# Patient Record
Sex: FEMALE | Race: WHITE | NOT HISPANIC OR LATINO | Employment: FULL TIME | URBAN - METROPOLITAN AREA
[De-identification: names, ages, dates, MRNs, and addresses within clinical notes are randomized per-mention and may not be internally consistent; named-entity substitution may affect disease eponyms.]

---

## 2017-01-19 ENCOUNTER — OFFICE VISIT (OUTPATIENT)
Dept: PSYCHIATRY | Facility: HOSPITAL | Age: 52
End: 2017-01-19

## 2017-01-19 DIAGNOSIS — F43.23 ADJUSTMENT DISORDER WITH MIXED ANXIETY AND DEPRESSED MOOD: Primary | ICD-10-CM

## 2017-01-19 PROCEDURE — 90834 PSYTX W PT 45 MINUTES: CPT | Performed by: SOCIAL WORKER

## 2017-01-19 NOTE — PROGRESS NOTES
"5:05pm-5:55pm  Client angry, frustrated by her treatment at work.  The new boss does not seem to value her knowledge of the company or the contribution that she has made over the years.  She is actively looking for another job.  Son is still out of the house but still a source of stress.   is working small jobs on occasion but she wants him to get a \"real job\" and contribute.  "

## 2017-01-23 ENCOUNTER — TELEPHONE (OUTPATIENT)
Dept: RETAIL CLINIC | Facility: CLINIC | Age: 52
End: 2017-01-23

## 2017-02-02 ENCOUNTER — APPOINTMENT (OUTPATIENT)
Dept: PSYCHIATRY | Facility: HOSPITAL | Age: 52
End: 2017-02-02

## 2017-02-02 ENCOUNTER — OFFICE VISIT (OUTPATIENT)
Dept: PSYCHIATRY | Facility: HOSPITAL | Age: 52
End: 2017-02-02

## 2017-02-02 DIAGNOSIS — F43.23 ADJUSTMENT DISORDER WITH MIXED ANXIETY AND DEPRESSED MOOD: Primary | ICD-10-CM

## 2017-02-02 PROCEDURE — 90834 PSYTX W PT 45 MINUTES: CPT | Performed by: SOCIAL WORKER

## 2017-02-02 NOTE — PROGRESS NOTES
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  Her new manager brought someone in from her previous job.  Client has a good severance package but is upset by the way it was handled.  She has had several interviews already.  Has told  he needs to get a real job.

## 2017-02-16 ENCOUNTER — OFFICE VISIT (OUTPATIENT)
Dept: PSYCHIATRY | Facility: HOSPITAL | Age: 52
End: 2017-02-16

## 2017-02-16 DIAGNOSIS — F43.23 ADJUSTMENT DISORDER WITH MIXED ANXIETY AND DEPRESSED MOOD: Primary | ICD-10-CM

## 2017-02-16 PROCEDURE — 90834 PSYTX W PT 45 MINUTES: CPT | Performed by: SOCIAL WORKER

## 2017-02-16 NOTE — PROGRESS NOTES
1:10pm-2:00pm  Client has had several promising interviews.   sent a counter offer and she is pleased with what they settled on.  Still upset by the way she was treated after having been with the company so long.   did get a job-part-time but a regular job.  Client is worried about her son.

## 2017-03-02 ENCOUNTER — TELEPHONE (OUTPATIENT)
Dept: RETAIL CLINIC | Facility: CLINIC | Age: 52
End: 2017-03-02

## 2017-03-03 ENCOUNTER — OFFICE VISIT (OUTPATIENT)
Dept: PSYCHIATRY | Facility: HOSPITAL | Age: 52
End: 2017-03-03

## 2017-03-03 DIAGNOSIS — F43.23 ADJUSTMENT DISORDER WITH MIXED ANXIETY AND DEPRESSED MOOD: Primary | ICD-10-CM

## 2017-03-03 PROCEDURE — 90834 PSYTX W PT 45 MINUTES: CPT | Performed by: SOCIAL WORKER

## 2017-03-03 RX ORDER — MONTELUKAST SODIUM 10 MG/1
10 TABLET ORAL NIGHTLY
Qty: 90 TABLET | Refills: 3
Start: 2017-03-03

## 2017-03-03 NOTE — PROGRESS NOTES
10:05am:10:55am  Client is encouraged that she has had so many interviews, people reaching out to her.  Salary is an issue because she was paid well at [employer] and had great benefits.  She is enjoying the time off as she has always worked and for years has been the primary breadwinner.  Still has some bad feelings about the way her job ended even though she was no longer happy there.  Reviewed treatment plan.

## 2017-03-08 NOTE — PLAN OF CARE
Problem:  Patient Care Overview (Adult)  Goal: Plan of Care Review    03/08/17 1312   Coping/Psychosocial Response Interventions   Plan Of Care Reviewed With patient   Coping/Psychosocial   Patient Agreement with Plan of Care agrees   Patient Care Overview   Progress improving   Outcome Evaluation   Outcome Summary/Follow up Plan Client rated her relationship issues as a 7 out of 10; vocational stress resolved as she is no longer working for the company associated with the stress.

## 2017-03-08 NOTE — TREATMENT PLAN
Multi-Disciplinary Problems (from Behavioral Health Treatment Plan)    Active Problems     Problem: Relationship Issues (Priority: --)  (Start Date: 03/08/17) (Resolve Date: --)    Problem Details:  The patient self-scales this problem as a 7 with 10 being the worst.         Goal Start Date End Date    Patient will initiate personal change to improve relationships. 03/08/17 --    Goal Details:  Progress toward goal:  The patient self-scales their progress related to this goal as a 7 with 10 being the worst.         Goal Intervention Frequency Start Date End Date    Assist patient in identifying behaviors that focus on relationship building and process the changes needed to improve relationships. Weekly 03/08/17 --    Intervention Details:  Duration of treatment until until discharged.               Problem: BH Patient Care Overview (Adult) (Priority: --)  (Start Date: 03/08/17) (Resolve Date: --)    Goal Start Date End Date    Plan of Care Review 03/08/17 --                       I have discussed and reviewed this treatment plan with the patient.  It has been printed for signatures.

## 2017-03-10 ENCOUNTER — TELEPHONE (OUTPATIENT)
Dept: RETAIL CLINIC | Facility: CLINIC | Age: 52
End: 2017-03-10

## 2017-03-10 NOTE — TELEPHONE ENCOUNTER
Patient called to request a refill on in house dispensed Singulair. Refill available and dispensed

## 2017-03-17 ENCOUNTER — OFFICE VISIT (OUTPATIENT)
Dept: PSYCHIATRY | Facility: HOSPITAL | Age: 52
End: 2017-03-17

## 2017-03-17 DIAGNOSIS — F43.23 ADJUSTMENT DISORDER WITH MIXED ANXIETY AND DEPRESSED MOOD: Primary | ICD-10-CM

## 2017-03-17 PROCEDURE — 90834 PSYTX W PT 45 MINUTES: CPT | Performed by: SOCIAL WORKER

## 2017-03-17 NOTE — PROGRESS NOTES
9:00am-9:50am  Client accepted a job to start 4/3.  She is anxious about it but the salary and benefits are good and they seem to be stable financially.  She is going to take a vacation before starting.  Annoyed that her  was not interested in vacation.  Worried about what their life would look like after their daughter leaves the house.

## 2017-03-31 ENCOUNTER — OFFICE VISIT (OUTPATIENT)
Dept: PSYCHIATRY | Facility: HOSPITAL | Age: 52
End: 2017-03-31

## 2017-03-31 DIAGNOSIS — F43.23 ADJUSTMENT DISORDER WITH MIXED ANXIETY AND DEPRESSED MOOD: Primary | ICD-10-CM

## 2017-03-31 PROCEDURE — 90834 PSYTX W PT 45 MINUTES: CPT | Performed by: SOCIAL WORKER

## 2017-03-31 NOTE — PROGRESS NOTES
12:05pm-12:55pm  Client will start her new job on Monday-mixed feelings.  She went to [place] with her daughter and a friend and then [place] trip.  Her  was not interested in going on the trips.  Client seems increasingly more frustrated that he never wants to do anything.  She worries about what will happen when her daughter leaves home.

## 2017-04-19 ENCOUNTER — OFFICE VISIT (OUTPATIENT)
Dept: RETAIL CLINIC | Facility: CLINIC | Age: 52
End: 2017-04-19

## 2017-04-19 VITALS
TEMPERATURE: 97.2 F | RESPIRATION RATE: 14 BRPM | HEIGHT: 67 IN | SYSTOLIC BLOOD PRESSURE: 147 MMHG | BODY MASS INDEX: 23.7 KG/M2 | OXYGEN SATURATION: 99 % | HEART RATE: 70 BPM | WEIGHT: 151 LBS | DIASTOLIC BLOOD PRESSURE: 89 MMHG

## 2017-04-19 DIAGNOSIS — R60.9 DEPENDENT EDEMA: ICD-10-CM

## 2017-04-19 DIAGNOSIS — I83.893 VARICOSE VEINS OF BILATERAL LOWER EXTREMITIES WITH OTHER COMPLICATIONS: ICD-10-CM

## 2017-04-19 DIAGNOSIS — R60.9 PERIPHERAL EDEMA: Primary | ICD-10-CM

## 2017-04-19 PROCEDURE — 99213 OFFICE O/P EST LOW 20 MIN: CPT | Performed by: FAMILY MEDICINE

## 2017-04-19 PROCEDURE — 36415 COLL VENOUS BLD VENIPUNCTURE: CPT | Performed by: FAMILY MEDICINE

## 2017-04-19 RX ORDER — ASPIRIN 81 MG/1
81 TABLET, CHEWABLE ORAL DAILY
COMMUNITY
End: 2022-08-24

## 2017-04-19 RX ORDER — FUROSEMIDE 20 MG/1
TABLET ORAL
Qty: 30 TABLET | Refills: 2 | Status: SHIPPED | OUTPATIENT
Start: 2017-04-19 | End: 2022-08-25 | Stop reason: HOSPADM

## 2017-04-19 NOTE — PROGRESS NOTES
"Subjective   Manuela Molina is a 52 y.o. female presents for   Chief Complaint   Patient presents with   • Foot Swelling     pt on Maxide for fluid retention not working       History of Present Illness    Manuela is a 52 year old that is usually healthy but has a history of pedal edema but for the past 2-3 weeks the swelling has getting worse.  Is swollen even in the morning.  She doesn't think that her diet has changed any, although she has been eating out a lot.  Drinks 4-5 glasses per day and also sweet tea for the caffeine.  Swelling as worst at the end of the day.  No redness or pain, but sometimes feet feel tight at the end of the day.  Otherwise is feeling fine.  Has been more stressed lately.  She has been at a new job for the last 3 weeks and will be traveling to [facility] on Saturday.  It will take around 24 hrs to get there and she's worried about swelling on the trip to get there (plane and car).      The following portions of the patient's history were reviewed and updated as appropriate: allergies, current medications, past family history, past medical history, past social history, past surgical history and problem list.    Review of Systems   Constitutional: Negative.    HENT: Negative.    Eyes: Negative.    Respiratory: Negative.    Cardiovascular: Positive for leg swelling (feet only). Negative for chest pain and palpitations.   Gastrointestinal: Negative.    Endocrine: Negative.    Genitourinary: Negative.    Musculoskeletal: Negative.    Skin: Negative.    Allergic/Immunologic: Negative.    Neurological: Negative.    Hematological: Negative.    Psychiatric/Behavioral: Negative.        Objective   Vitals:    04/19/17 1056   BP: 147/89   Pulse: 70   Resp: 14   Temp: 97.2 °F (36.2 °C)   TempSrc: Oral   SpO2: 99%   Weight: 151 lb (68.5 kg)   Height: 67\" (170.2 cm)     Body mass index is 23.65 kg/(m^2).    Physical Exam   Constitutional: She is oriented to person, place, and time. She appears " well-developed and well-nourished. No distress.   HENT:   Head: Normocephalic and atraumatic.   Mouth/Throat: Oropharynx is clear and moist. No oropharyngeal exudate.   Eyes: Pupils are equal, round, and reactive to light. No scleral icterus.   Neck: Neck supple. No thyromegaly present.   Cardiovascular: Normal rate, regular rhythm, S1 normal, S2 normal, normal heart sounds and intact distal pulses.   No extrasystoles are present. Exam reveals no gallop, no S3, no S4, no distant heart sounds and no friction rub.    No murmur heard.  Pulmonary/Chest: Effort normal and breath sounds normal. No respiratory distress. She has no wheezes. She has no rales.   Musculoskeletal: Normal range of motion. She exhibits edema (dependent edema or both feet (R > L)). She exhibits no tenderness.   Lymphadenopathy:     She has no cervical adenopathy.   Neurological: She is alert and oriented to person, place, and time.   Skin: Skin is warm and dry. No rash noted. No erythema.   Nursing note and vitals reviewed.      Assessment/Plan   Manuela was seen today for foot swelling.    Diagnoses and all orders for this visit:    Peripheral edema    Dependent edema  -     Basic Metabolic Panel  -     Compression Stockings    Varicose veins of bilateral lower extremities with other complications    Other orders  -     furosemide (LASIX) 20 MG tablet; 1/2 tab po QD-BID prn edema     Advised to take an aspirin 81mg daily while flying.  Wear compression stockings while on the plane or if will be walking or prn edema.  Increase water intake gradually to 100oz per day.  Check a BMP and if BUN and Creat are both normal then ok to fill Lasix and take only as a last resort prn.

## 2017-04-20 LAB
BUN SERPL-MCNC: 9 MG/DL (ref 6–24)
BUN/CREAT SERPL: 14 (ref 9–23)
CALCIUM SERPL-MCNC: 9.1 MG/DL (ref 8.7–10.2)
CHLORIDE SERPL-SCNC: 98 MMOL/L (ref 96–106)
CO2 SERPL-SCNC: 25 MMOL/L (ref 18–29)
CREAT SERPL-MCNC: 0.65 MG/DL (ref 0.57–1)
GLUCOSE SERPL-MCNC: 92 MG/DL (ref 65–99)
POTASSIUM SERPL-SCNC: 3.9 MMOL/L (ref 3.5–5.2)
SODIUM SERPL-SCNC: 141 MMOL/L (ref 134–144)

## 2017-04-24 ENCOUNTER — APPOINTMENT (OUTPATIENT)
Dept: PSYCHIATRY | Facility: HOSPITAL | Age: 52
End: 2017-04-24

## 2017-05-17 ENCOUNTER — APPOINTMENT (OUTPATIENT)
Dept: PSYCHIATRY | Facility: HOSPITAL | Age: 52
End: 2017-05-17

## 2017-06-05 ENCOUNTER — OFFICE VISIT (OUTPATIENT)
Dept: PSYCHIATRY | Facility: HOSPITAL | Age: 52
End: 2017-06-05

## 2017-06-05 DIAGNOSIS — F43.23 ADJUSTMENT DISORDER WITH MIXED ANXIETY AND DEPRESSED MOOD: Primary | ICD-10-CM

## 2017-06-05 PROCEDURE — 90834 PSYTX W PT 45 MINUTES: CPT | Performed by: SOCIAL WORKER

## 2017-06-05 NOTE — PROGRESS NOTES
5:10pm-6:00pm  Client does not like her new job.  The pay and benefits are good but the job is not challenging and she does not see how she can make any headway in her current situation.  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  She had another interview last week and is hoping they make her an offer.  The pay would be less and the benefits not as rich but it would be interesting.  She has only been there 2 months but is very unhappy.  Will call, as needed.

## 2019-04-10 ENCOUNTER — TRANSCRIBE ORDERS (OUTPATIENT)
Dept: OCCUPATIONAL THERAPY | Facility: CLINIC | Age: 54
End: 2019-04-10

## 2019-04-10 DIAGNOSIS — S46.911D RIGHT SHOULDER STRAIN, SUBSEQUENT ENCOUNTER: Primary | ICD-10-CM

## 2019-04-12 ENCOUNTER — TREATMENT (OUTPATIENT)
Dept: PHYSICAL THERAPY | Facility: CLINIC | Age: 54
End: 2019-04-12

## 2019-04-12 DIAGNOSIS — S46.911D RIGHT SHOULDER STRAIN, SUBSEQUENT ENCOUNTER: ICD-10-CM

## 2019-04-12 DIAGNOSIS — S46.911D STRAIN OF RIGHT SHOULDER, SUBSEQUENT ENCOUNTER: Primary | ICD-10-CM

## 2019-04-12 PROCEDURE — 97035 APP MDLTY 1+ULTRASOUND EA 15: CPT | Performed by: PHYSICAL THERAPIST

## 2019-04-12 PROCEDURE — 97140 MANUAL THERAPY 1/> REGIONS: CPT | Performed by: PHYSICAL THERAPIST

## 2019-04-12 PROCEDURE — 97110 THERAPEUTIC EXERCISES: CPT | Performed by: PHYSICAL THERAPIST

## 2019-04-12 PROCEDURE — 97162 PT EVAL MOD COMPLEX 30 MIN: CPT | Performed by: PHYSICAL THERAPIST

## 2019-04-12 PROCEDURE — 97530 THERAPEUTIC ACTIVITIES: CPT | Performed by: PHYSICAL THERAPIST

## 2019-04-12 NOTE — PROGRESS NOTES
Orthopedic / Sports / Industrial Physical Therapy  Physical Therapy Initial Evaluation and Plan of Care    Patient Name: Manuela Molina          :  1965  Referring Physician: Chidi Simms MD  Diagnosis: Strain of right shoulder, subsequent encounter [M63.042W]    Date of Evaluation: 2019  ______________________________________________________________________    Subjective Evaluation    History of Present Illness  Date of onset: 2018  Mechanism of injury: Fell down stairs - Missed last step carrying laptop case (L) hand and suit case in (R) hand  and hit (R) elbow on suit case -     Pain  Location: Lateral, ANT/Lat shoulder (R) down arm into ant/lat elbow / volar forearm (proximal) ; Denies NT-ing in (R) UE -   Quality: Stabbing.  Alleviating factors: Stop activity -   Exacerbated by: (R) side-lying; AROM / FE, Ext, IRB,     Hand dominance: right    Diagnostic Tests  X-ray: normal    Treatments  Current treatment: medication  Patient Goals  Patient/family treatment goals: Pain alleviation; normal mobility, strength, function, and return to normal job w/o restrictions -        ___________________________________________________  Objective       Postural Observations    Additional Postural Observation Details  Rounded shoulder posture; Ant HH; Fwd head;     Palpation     Additional Palpation Details  Palpable crepitus / grinding superior GH Jt w/ AROM/ mobilizatoin of shoulder;   Minimal palpable tenderness of RTC and LH biceps tendon and deltoid -    Active Range of Motion     Additional Active Range of Motion Details  (R) shoulder; Limited and painful IRB; FE near full w/ pain down (R) arm to elbow -     Strength/Myotome Testing     Additional Strength Details  Strong Empty Can and ER/IRS    Tests     Additional Tests Details  (R) [place]: (+) Posterior Impingement; (+) [place];s; (+) Speeds      TREATMENT:  % 2.0 posterior shoulder (R);                             MT: Posterior GH  Jt Mobs;  Manual stretching into ERS/ER/ERA w/ PA mobilization x 20 min.                            TE: Scap Retraction (B), (R) 20/10 sec ea  FUNCTIONAL ACTIVITIES: X 10 min  · TAPING / BRACING: NA  · Jt protection, ADL modification; Posture and     ___________________________________________________  Assessment & Plan     Assessment  Assessment details: (R) shoulder strain / pain; Posterior Impingement with probable labral / SLAP involvement -     PROBLEMS: Pain; Limited mobility, intolerance to ADL's and job-related duties and postural dysfunciton -   PROGNOSIS: Good -     GOALS:   SHORT TERM GOALS: 2 weeks:  1) HEP Initiated; 2) Pain decreased 50%:   3) AROM  increased:  4) Improved functional ability grossly;   LONG TERM GOALS: 4 weeks (or at time of DISCHARGE): 1) (I) HEP; 2) AROM WFL and pain free; 3) Strength / mobility to be able to perform all ADL's and job-related activities w/o restrictions;                 Plan  Planned therapy interventions: flexibility, home exercise program, joint mobilization, manual therapy, neuromuscular re-education, postural training, soft tissue mobilization, strengthening, stretching and therapeutic activities (Modalities prn; Taping prn; )  Frequency: 3x week  Duration in weeks: 4  Treatment plan discussed with: patient    ___________________________________________________  Manual Therapy:    20     mins  45878;   Therapeutic Exercise:    05     mins  22129;     Neuromuscular Brandon:        mins  43541;   Therapeutic Activity:     15     mins  73037;     Ultrasound:     10     mins  17094;    Electrical Stimulation:        mins  12742 ( );  Dry Needling          mins self-pay   Gait Training:          mins  99385;  EVAL TIME:   25 mins    Timed Treatment:   50   mins                Total Treatment:     85   mins    PT SIGNATURE:   Rodrigo Perales, PT  DATE TREATMENT INITIATED: 4/12/2019  ___________________________________________________  Initial  Certification  Certification Period: 7/11/2019  I certify that the therapy services are furnished while this patient is under my care.  The services outlined above are required by this patient, and will be reviewed every 90 days.     PHYSICIAN: ________________________________  DATE: ______  Chidi Simms MD        Please sign and return via fax to [phone].. Thank you, [facility].  ______________________________________________________________________  [address] [place] [address]  Phone: [phone] Fax: [phone]

## 2019-04-15 ENCOUNTER — TREATMENT (OUTPATIENT)
Dept: PHYSICAL THERAPY | Facility: CLINIC | Age: 54
End: 2019-04-15

## 2019-04-15 DIAGNOSIS — S46.911D STRAIN OF RIGHT SHOULDER, SUBSEQUENT ENCOUNTER: Primary | ICD-10-CM

## 2019-04-15 PROCEDURE — 97140 MANUAL THERAPY 1/> REGIONS: CPT | Performed by: PHYSICAL THERAPIST

## 2019-04-15 PROCEDURE — 97112 NEUROMUSCULAR REEDUCATION: CPT | Performed by: PHYSICAL THERAPIST

## 2019-04-15 PROCEDURE — 97110 THERAPEUTIC EXERCISES: CPT | Performed by: PHYSICAL THERAPIST

## 2019-04-15 NOTE — PROGRESS NOTES
Physical Therapy Daily Progress Note    Patient Name: Manuela Molina         :  1965  Referring Physician: Chidi Simms MD    Subjective   Manuela Molina reports: Feeling better with improved mobility - Not much pain today -     Objective   Full AROM w/o pain (much improved IRB)  Improved passive ERS w/o posterior impingement; ER/ERS near full with posterior impingement at end range -    See Exercise, Manual, and Modality Logs for complete treatment.     Functional / Therapeutic Activities:   min  · TAPING / BRACING: NA  · Jt protection, ADL modification; Posture and      Assessment/Plan  : (R) shoulder strain / pain; Posterior Impingement with probable labral / SLAP involvement -    Improving with decreased pain and improve mobility and function -    Progress strengthening /stabilization /functional activity       _________________________________________________  Manual Therapy:    15     mins  83039;  Therapeutic Exercise:    25     mins  75184;     Neuromuscular Brandon:    10    mins  20197;    Therapeutic Activity:          mins  06125;     Gait Training:           mins  71344;     Ultrasound:          mins  55986;    Electrical Stimulation:         mins  31713 ( );  Dry Needling          mins self-pay    Timed Treatment:   50   mins                  Total Treatment:     60   mins    Rodrigo Perales PT  Physical Therapist

## 2019-04-17 ENCOUNTER — TREATMENT (OUTPATIENT)
Dept: PHYSICAL THERAPY | Facility: CLINIC | Age: 54
End: 2019-04-17

## 2019-04-17 DIAGNOSIS — S46.911D STRAIN OF RIGHT SHOULDER, SUBSEQUENT ENCOUNTER: Primary | ICD-10-CM

## 2019-04-17 DIAGNOSIS — S46.911D RIGHT SHOULDER STRAIN, SUBSEQUENT ENCOUNTER: ICD-10-CM

## 2019-04-17 PROCEDURE — 97112 NEUROMUSCULAR REEDUCATION: CPT | Performed by: PHYSICAL THERAPIST

## 2019-04-17 PROCEDURE — 97110 THERAPEUTIC EXERCISES: CPT | Performed by: PHYSICAL THERAPIST

## 2019-04-17 PROCEDURE — 97530 THERAPEUTIC ACTIVITIES: CPT | Performed by: PHYSICAL THERAPIST

## 2019-04-17 NOTE — PROGRESS NOTES
Physical Therapy Daily Progress Note     Patient Name: Manuela Molina         :  1965  Referring Physician: Chidi Simms MD     Subjective   Manuela Molina reports: Feeling better with improved mobility - Not much pain today -   -SEE MD PROGRESS NOTE-    Objective   Full AROM w/o pain (much improved IRB)  Improved passive ERS w/o posterior impingement; ER/ERS near full with posterior impingement at end range -  -SEE MD PROGRESS NOTE-     See Exercise, Manual, and Modality Logs for complete treatment.     Functional / Therapeutic Activities: 10  min  · TAPING / BRACING: NA  · FUNCTIONAL ASSESSMENT  · Jt protection, ADL modification; Posture and       Assessment/Plan  : (R) shoulder strain / pain; Posterior Impingement with probable labral / SLAP involvement -    Improving with decreased pain and improve mobility and function -   Decreasing posterior impingement (R) shoulder w/ ER/ERA and improved AROM   -SEE MD PROGRESS NOTE-     PLAN:  -SEE MD PROGRESS NOTE-  Progress strengthening /stabilization /functional activity     _________________________________________________  Manual Therapy:                 mins  89001;  Therapeutic Exercise:    30     mins  92647;     Neuromuscular Brandon:    20    mins  08373;    Therapeutic Activity:      10     mins  46271;     Gait Training:                      mins  98000;     Ultrasound:                          mins  77800;    Electrical Stimulation:         mins  07447 ( );  Dry Needling                       mins self-pay     Timed Treatment:  60   mins                  Total Treatment:     75   mins     Rodrigo Perales PT  Physical Therapist

## 2019-04-17 NOTE — PROGRESS NOTES
------------------------------------------------------------------------------------------------------   MD PROGRESS NOTE    Patient: Manuela Molina        : 1965  Diagnosis/ICD-10 Code:  Strain of right shoulder, subsequent encounter [S46.911D]  Referring practitioner: Chidi Simms MD / Glenny Howard PA-C  Date of Initial Visit: 2019                  Today's Date: 2019  _________________________________________________________________    Thank you for the referral of Ms. Molina to [facility].  Ms. Molina has attended 3 PT sessions and their treatment has consisted of: modalities prn, manual therapy, therapeutic exercise, patient education, and HEP.     Subjective   Manuela Molina reports: continued improvement with decreased pain and improved mobility and function - Able to ER/Horz ABduct shoulder farther before pain in posterior-lateral shoulder (R) - Much improved ability to raise her arm OH and up behind her back - More able to sleep on (L) side -   ___________________________________________________________________  Objective              OBSERVATION: Improved posturing -               PALPATION: Less tender posterior / post-lat shoulder (R)         AROM: Full FE, ERB and near full IRB w/o pain               PROM:  ERS/ER < ERA with Posterior pain (R) shoulder;  Limted OLIVIER, but w/o pain now    STRENGTH: Strong RTC (R) shoulder w/o pain    SPECIAL TESTS: (+) Posterior Impingement (R) shoulder w/ ERA >> ERS/ER  (-) Empty Can; (-) Drop Arm; (-) Speeds and [place]'s;                 ACTIVITY TOLERANCE: Improved tolerance with reaching / lifting and functional use of (R) shoulder, but limited with activities requiring extreme ER, especially into ERA and Horizontal ABduction-   ___________________________________________________________________   Assessment/Plan  (R) shoulder strain / pain; Posterior Impingement with probable labral involvement -    Improving with  decreased pain and improve mobility and function -   Persistent, but decreasing posterior impingement (R) shoulder w/ ER/ERA, and Horiz ABDuction, and improved AROM   Ms. Molina would benefit from continued Physical Therapy.     P: Recommend continued Physical Therapy to allow a full and safe return to ADL's and normal job duties.   Please advise after your exam.    Thank you again for this referral of Ms. Molina to [facility].    PT Signature: ______________________________   Rodrigo Perales, PT    ______________________________________________________________________  [address] [place] [address]  Phone: [phone] Fax: [phone]

## 2019-04-18 ENCOUNTER — TREATMENT (OUTPATIENT)
Dept: PHYSICAL THERAPY | Facility: CLINIC | Age: 54
End: 2019-04-18

## 2019-04-18 DIAGNOSIS — S46.911D STRAIN OF RIGHT SHOULDER, SUBSEQUENT ENCOUNTER: Primary | ICD-10-CM

## 2019-04-18 PROCEDURE — 97110 THERAPEUTIC EXERCISES: CPT | Performed by: PHYSICAL THERAPIST

## 2019-04-18 PROCEDURE — 97112 NEUROMUSCULAR REEDUCATION: CPT | Performed by: PHYSICAL THERAPIST

## 2019-04-18 PROCEDURE — 97140 MANUAL THERAPY 1/> REGIONS: CPT | Performed by: PHYSICAL THERAPIST

## 2019-04-19 ENCOUNTER — TREATMENT (OUTPATIENT)
Dept: PHYSICAL THERAPY | Facility: CLINIC | Age: 54
End: 2019-04-19

## 2019-04-19 DIAGNOSIS — S46.911D STRAIN OF RIGHT SHOULDER, SUBSEQUENT ENCOUNTER: Primary | ICD-10-CM

## 2019-04-19 PROCEDURE — 97530 THERAPEUTIC ACTIVITIES: CPT | Performed by: PHYSICAL THERAPIST

## 2019-04-19 PROCEDURE — 97112 NEUROMUSCULAR REEDUCATION: CPT | Performed by: PHYSICAL THERAPIST

## 2019-04-19 PROCEDURE — 97110 THERAPEUTIC EXERCISES: CPT | Performed by: PHYSICAL THERAPIST

## 2019-04-22 NOTE — PROGRESS NOTES
Physical Therapy Daily Progress Note     Patient Name: Manuela Molina         :  1965  Referring Physician: Chidi Simms MD     Subjective   Manuela Molina reports: Feeling better with improved mobility - Not much pain today -      Objective   Full AROM w/o pain (much improved IRB)  ERS w/ posterior impingement; at end range;  ER/ERA with posterior impingement in last 1/3 ROM especially ERA w/ palpable clunk with AP pressure to humeral head (L) as HH clunking back into proper position -     See Exercise, Manual, and Modality Logs for complete treatment.     Functional / Therapeutic Activities:   min  · TAPING / BRACING: NA  · Jt protection, ADL modification; Posture and       Assessment/Plan  : (R) shoulder strain / pain; Posterior Impingement with probable labral / SLAP involvement -    Improving with decreased pain and improve mobility and function -     Progress strengthening /stabilization /functional activity     _________________________________________________  Manual Therapy:            15     mins  76246;  Therapeutic Exercise:    30     mins  89572;     Neuromuscular Brandon:    10    mins  35191;    Therapeutic Activity:           mins  15676;     Gait Training:                      mins  84026;     Ultrasound:                          mins  91769;    Electrical Stimulation:         mins  59752 ( );  Dry Needling                       mins self-pay     Timed Treatment:   55   mins                  Total Treatment:     75   mins     Rodrigo Perales PT  Physical Therapist

## 2019-04-24 ENCOUNTER — TREATMENT (OUTPATIENT)
Dept: PHYSICAL THERAPY | Facility: CLINIC | Age: 54
End: 2019-04-24

## 2019-04-24 DIAGNOSIS — S46.911D STRAIN OF RIGHT SHOULDER, SUBSEQUENT ENCOUNTER: Primary | ICD-10-CM

## 2019-04-24 PROCEDURE — 97110 THERAPEUTIC EXERCISES: CPT | Performed by: PHYSICAL THERAPIST

## 2019-04-24 PROCEDURE — 97112 NEUROMUSCULAR REEDUCATION: CPT | Performed by: PHYSICAL THERAPIST

## 2019-04-24 PROCEDURE — 97530 THERAPEUTIC ACTIVITIES: CPT | Performed by: PHYSICAL THERAPIST

## 2019-04-24 NOTE — PROGRESS NOTES
------------------------------------------------------------------------------------------------------   MD PROGRESS NOTE     Patient: Manuela Molina        : 1965  Diagnosis/ICD-10 Code:  Strain of right shoulder, subsequent encounter [S46.911D]  Referring practitioner: Chidi Simms MD / Glenny Howard PA-C  Date of Initial Visit: 2019                  Today's Date: 2019  _________________________________________________________________     Thank you for the referral of Ms. Molina to [facility].  Ms. Molina has attended 6 PT sessions and their treatment has consisted of: modalities prn, manual therapy, therapeutic exercise, patient education, and HEP.      Subjective   Manuela Molina reports: continued improvement with decreased pain and improved mobility and function -  More able to sleep on (L) side -   Persistent posterior, post/lat shoulder/arm pain with reaching into ERA and Horizontal ABDuction including -   ___________________________________________________________________  Objective              OBSERVATION: Improved posturing -               PALPATION: Tender deep posterior / post-lat shoulder (R); Palpable grind/crepitus post/sup GH Jt and clunk with mobilization AP GH Jt (R) -                AROM: FE improved, but with posterior impingement at end range, ERB  IRB WNL w/o pain               PROM: Limited and painful ERA>ER and Horizontal ABduction with posterior impingement (R) shoulder-               STRENGTH: Strong RTC (R) shoulder w/o pain               SPECIAL TESTS: (+) Posterior Impingement (R) shoulder w/ ERA >> ERS/ER  (-) Empty Can; (-) Drop Arm; (-) Speeds and [place]'s;                      ACTIVITY TOLERANCE: Improved tolerance with reaching / lifting and functional use of (R) shoulder, but limited with activities requiring extreme ER, especially into ERA and Horizontal ABduction-               ___________________________________________________________________   Assessment/Plan  (R) shoulder strain / pain; Posterior Impingement with probable labral involvement-  Improving with decreased pain and improve mobility and function -   Persistent posterior impingement (R) shoulder w/ ER/ERA, and Horiz ABDuction, -  Ms. Molina would benefit from further assessment (Ortho referral, etc) to rule out possible labral involvement due to plateau in PT and persistent posterior impingement limiting full return to functional activities -      P: Recommend  further assessment (Ortho referral, etc) to rule out possible labral involvement and continuing HEP.   Please advise after your exam.     Thank you again for this referral of Ms. Molina to [facility].     PT Signature: ______________________________   Rodrigo Perales, PT     ______________________________________________________________________  [address] [facility] [place] [address]  Phone: [phone]                 Fax: [phone]

## 2019-04-24 NOTE — PROGRESS NOTES
Physical Therapy Daily Progress Note     Patient Name: Manuela Molina         :  1965  Referring Physician: Chidi Simms MD     Subjective   -SEE MD PROGRESS NOTE-     Objective   -SEE MD PROGRESS NOTE-     See Exercise, Manual, and Modality Logs for complete treatment.     Functional / Therapeutic Activities: 20  min  · TAPING / BRACING: NA  · FUNCTIONAL ASSESSMENT -   · Jt protection, ADL modification; Posture and       Assessment/Plan  : (R) shoulder strain / pain; Posterior Impingement with probable labral / SLAP involvement -    -SEE MD PROGRESS NOTE-     Plan: DC PT refer to Ortho, etc  -SEE MD PROGRESS NOTE-       _________________________________________________  Manual Therapy:                 mins  00040;  Therapeutic Exercise:    30     mins  65683;     Neuromuscular Brandon:    10    mins  63245;    Therapeutic Activity:      20     mins  87657;     Gait Training:                      mins  24417;     Ultrasound:                          mins  93255;    Electrical Stimulation:         mins  34702 ( );  Dry Needling                       mins self-pay     Timed Treatment:   60   mins                  Total Treatment:     70   mins     Rodrigo Perales, PT  Physical Therapist

## 2019-09-25 ENCOUNTER — TREATMENT (OUTPATIENT)
Dept: PHYSICAL THERAPY | Facility: CLINIC | Age: 54
End: 2019-09-25

## 2019-09-25 DIAGNOSIS — S46.911D STRAIN OF RIGHT SHOULDER, SUBSEQUENT ENCOUNTER: ICD-10-CM

## 2019-09-25 DIAGNOSIS — M75.41 IMPINGEMENT SYNDROME OF SHOULDER, RIGHT: Primary | ICD-10-CM

## 2019-09-25 PROCEDURE — 97530 THERAPEUTIC ACTIVITIES: CPT | Performed by: PHYSICAL THERAPIST

## 2019-09-25 PROCEDURE — 97140 MANUAL THERAPY 1/> REGIONS: CPT | Performed by: PHYSICAL THERAPIST

## 2019-09-25 PROCEDURE — 97162 PT EVAL MOD COMPLEX 30 MIN: CPT | Performed by: PHYSICAL THERAPIST

## 2019-09-25 PROCEDURE — 97110 THERAPEUTIC EXERCISES: CPT | Performed by: PHYSICAL THERAPIST

## 2019-09-25 NOTE — PROGRESS NOTES
Orthopedic / Sports / Industrial Physical Therapy  Physical Therapy Initial Evaluation and Plan of Care     Patient Name: Manuela Molina          :  1965  Referring Physician: Mina Rosario MD  Diagnosis: Impingement (R) shoulder;     Date of Evaluation: 2019  ______________________________________________________________________     Subjective Evaluation     History of Present Illness  Date of onset: 2018  Mechanism of injury: Fell down stairs - Missed last step carrying laptop case (L) hand and suit case in (R) hand  and hit (R) elbow on suit case -   [facility] with PT at this facility - Then referred to Dr. Rosario after a lengthy weight due to waiting for worker's Comp to authorize the appointment - Received Cortizone shot which helped for about 4-6 weeks, but pain returned and waited another 6 weeks and then returned with persistent pain -   Had MRI - told she has arthritis, given another injection and given a steroid pack about 2 wks ago -   Pain  Location: Posterior, post-lat shoulder / arm pain -  Denies NT-ing in (R) UE -   Quality: Burning   Alleviating factors: Stop activity -   Exacerbated by: (R) side-lying; ABD, Horiz ABD, Reaching out to side;WB-ing with twisting; ERA/Horizontal ;   Hand dominance: right  Diagnostic Tests  X-ray: normal  MRI: No tears (per Pt)   Treatments  Current treatment: medication and cortizone injections x 2  Patient Goals  Pain alleviation; normal mobility, strength, function, and return to normal job w/o restrictions -     ___________________________________________________  Objective       Postural Observations  Rounded shoulder posture; Ant HH; Fwd head;      Palpation   Palpable crepitus / grinding superior GH Jt w/ AROM/ mobilizatoin of shoulder;   Minimal palpable tenderness of RTC and LH biceps tendon and deltoid -     Active Range of Motion   (R) shoulder; Limited and painful IRB; FE near full ERB WFL -      Strength/Myotome Testing   Strong Empty  Can and ER/IRS     Tests   (R) [place]: (+) Posterior Impingement ; (-)  [place];s; (-) Speeds ; (-) Empty Can       See Exercise / Manual Therapy / modality Flow Sheet for treatment details:     FUNCTIONAL ACTIVITIES: X 15 min  · TAPING / BRACING: NA  · Anatomy review, diagnosis  · Progression of therapy   · Jt protection, ADL modification; Posture and     ___________________________________________________  Assessment  (R) shoulder strain / pain; Posterior Impingement with probable labral / SLAP involvement -     PROBLEMS: Pain; Limited mobility, intolerance to ADL's and job-related duties and postural dysfunciton -   PROGNOSIS: Good -     GOALS:   SHORT TERM GOALS: 2 weeks:  1) HEP Initiated; 2) Pain decreased 50%:   3) AROM  increased:  4) Improved functional ability grossly;   LONG TERM GOALS: 4 weeks (or at time of DISCHARGE): 1) (I) HEP; 2) AROM WFL and pain free; 3) Strength / mobility to be able to perform all ADL's and job-related activities w/o restrictions;      Plan  Planned therapy interventions: flexibility, home exercise program, joint mobilization, manual therapy, neuromuscular re-education, postural training, soft tissue mobilization, strengthening, stretching and therapeutic activities (Modalities prn; Taping prn; )  Frequency: 3x week  Duration in weeks: 4  Treatment plan discussed with: patient   ___________________________________________________  Manual Therapy:            20     mins  41747;   Therapeutic Exercise:    20     mins  69076;     Neuromuscular Brandon:        mins  24326;   Therapeutic Activity:          15     mins  07344;     Ultrasound:                          mins  49473;    Electrical Stimulation:        mins  43036 ( );  Dry Needling                       mins self-pay   Gait Training:              mins  01565;  EVAL TIME:   25 mins     Timed Treatment:   55   mins                Total Treatment:     90   mins     PT SIGNATURE:   Rodrigo Perales, PT  DATE  TREATMENT INITIATED: 4/12/2019  ___________________________________________________  Initial Certification                Certification Period: 7/11/2019  I certify that the therapy services are furnished while this patient is under my care.  The services outlined above are required by this patient, and will be reviewed every 90 days.                PHYSICIAN: ________________________________  DATE: ______  Mina Rosario MD                                                  Please sign and return via fax to [phone].. Thank you, [facility].  ______________________________________________________________________  [address] [place] [address]  Phone: [phone]                 Fax: [phone]

## 2019-09-30 ENCOUNTER — TREATMENT (OUTPATIENT)
Dept: PHYSICAL THERAPY | Facility: CLINIC | Age: 54
End: 2019-09-30

## 2019-09-30 DIAGNOSIS — S46.911D STRAIN OF RIGHT SHOULDER, SUBSEQUENT ENCOUNTER: Primary | ICD-10-CM

## 2019-09-30 DIAGNOSIS — S46.911D RIGHT SHOULDER STRAIN, SUBSEQUENT ENCOUNTER: ICD-10-CM

## 2019-09-30 DIAGNOSIS — M75.41 IMPINGEMENT SYNDROME OF SHOULDER, RIGHT: ICD-10-CM

## 2019-09-30 PROCEDURE — 97112 NEUROMUSCULAR REEDUCATION: CPT | Performed by: PHYSICAL THERAPIST

## 2019-09-30 PROCEDURE — 97140 MANUAL THERAPY 1/> REGIONS: CPT | Performed by: PHYSICAL THERAPIST

## 2019-09-30 PROCEDURE — 97110 THERAPEUTIC EXERCISES: CPT | Performed by: PHYSICAL THERAPIST

## 2019-10-03 ENCOUNTER — TREATMENT (OUTPATIENT)
Dept: PHYSICAL THERAPY | Facility: CLINIC | Age: 54
End: 2019-10-03

## 2019-10-03 DIAGNOSIS — M75.41 IMPINGEMENT SYNDROME OF SHOULDER, RIGHT: ICD-10-CM

## 2019-10-03 DIAGNOSIS — S46.911D STRAIN OF RIGHT SHOULDER, SUBSEQUENT ENCOUNTER: Primary | ICD-10-CM

## 2019-10-03 PROCEDURE — 97112 NEUROMUSCULAR REEDUCATION: CPT | Performed by: PHYSICAL THERAPIST

## 2019-10-03 PROCEDURE — 97110 THERAPEUTIC EXERCISES: CPT | Performed by: PHYSICAL THERAPIST

## 2019-10-03 PROCEDURE — 97530 THERAPEUTIC ACTIVITIES: CPT | Performed by: PHYSICAL THERAPIST

## 2019-10-07 NOTE — PROGRESS NOTES
Physical Therapy Daily Progress Note     Patient Name: Manuela Molina         :  1965  Referring Physician: Florentino Rosario MD        Subjective   Manuela Molina reports: overall decreased pain - sore in pecs after last session (R) -      Objective   Tender posterior (R) shoulder; Posterior impingement, but improved -   Tight OLIVIER and IRB     See Exercise, Manual, and Modality Logs for complete treatment.     Functional / Therapeutic Activities: 10  min  · TAPING / BRACING:   · SEE EXERCISE FLOW SHEET -   · Jt protection, ADL modification; Posture and       Assessment/Plan  (R) shoulder strain / pain; Posterior Impingement with probable labral involvement -   Improving with decreased pain and improved mobility and function -      Progress strengthening /stabilization /functional activity     _________________________________________________  Manual Therapy:                 mins  31700;  Therapeutic Exercise:    30     mins  18386;     Neuromuscular Brandon:    10    mins  24641;    Therapeutic Activity:      10     mins  38257;     Gait Training:                      mins  26628;     Ultrasound:                          mins  02203;    Electrical Stimulation:         mins  88985 ( );  Dry Needling                       mins self-pay     Timed Treatment:   50   mins                  Total Treatment:     60   mins     Rodrigo Perales PT  Physical Therapist

## 2019-10-09 ENCOUNTER — TREATMENT (OUTPATIENT)
Dept: PHYSICAL THERAPY | Facility: CLINIC | Age: 54
End: 2019-10-09

## 2019-10-09 DIAGNOSIS — S46.911D STRAIN OF RIGHT SHOULDER, SUBSEQUENT ENCOUNTER: Primary | ICD-10-CM

## 2019-10-09 DIAGNOSIS — M75.41 IMPINGEMENT SYNDROME OF SHOULDER, RIGHT: ICD-10-CM

## 2019-10-09 PROCEDURE — 97530 THERAPEUTIC ACTIVITIES: CPT | Performed by: PHYSICAL THERAPIST

## 2019-10-09 PROCEDURE — 97112 NEUROMUSCULAR REEDUCATION: CPT | Performed by: PHYSICAL THERAPIST

## 2019-10-09 PROCEDURE — 97110 THERAPEUTIC EXERCISES: CPT | Performed by: PHYSICAL THERAPIST

## 2019-10-13 NOTE — PROGRESS NOTES
Physical Therapy Daily Progress Note     Patient Name: Manuela Molina         :  1965  Referring Physician: Florentino Rosario MD        Subjective   Manuela Molina reports: continued improvement with decreased pain and improved mobility and function -      Objective   Less Tender posterior (R) shoulder; Decreased Posterior impingement,  -   Less Tight OLIVIER and especially IRB     See Exercise, Manual, and Modality Logs for complete treatment.     Functional / Therapeutic Activities: 10  min  · TAPING / BRACING:   · SEE EXERCISE FLOW SHEET -   · Jt protection, ADL modification; Posture and       Assessment/Plan  (R) shoulder strain / pain; Posterior Impingement with probable labral involvement -   Improving with decreased pain and improved mobility and function -      Progress strengthening /stabilization /functional activity     _________________________________________________  Manual Therapy:                 mins  70710;  Therapeutic Exercise:    30     mins  72690;     Neuromuscular Brandon:    10    mins  09978;    Therapeutic Activity:      15     mins  86300;     Gait Training:                      mins  07952;     Ultrasound:                          mins  99807;    Electrical Stimulation:         mins  91293 ( );  Dry Needling                       mins self-pay     Timed Treatment:   55   mins                  Total Treatment:     70   mins     Rodrigo Perales PT  Physical Therapist

## 2019-10-14 ENCOUNTER — TREATMENT (OUTPATIENT)
Dept: PHYSICAL THERAPY | Facility: CLINIC | Age: 54
End: 2019-10-14

## 2019-10-14 DIAGNOSIS — M75.41 IMPINGEMENT SYNDROME OF SHOULDER, RIGHT: ICD-10-CM

## 2019-10-14 DIAGNOSIS — S46.911D STRAIN OF RIGHT SHOULDER, SUBSEQUENT ENCOUNTER: Primary | ICD-10-CM

## 2019-10-14 PROCEDURE — 97112 NEUROMUSCULAR REEDUCATION: CPT | Performed by: PHYSICAL THERAPIST

## 2019-10-14 PROCEDURE — 97014 ELECTRIC STIMULATION THERAPY: CPT | Performed by: PHYSICAL THERAPIST

## 2019-10-14 PROCEDURE — 97530 THERAPEUTIC ACTIVITIES: CPT | Performed by: PHYSICAL THERAPIST

## 2019-10-14 PROCEDURE — 97110 THERAPEUTIC EXERCISES: CPT | Performed by: PHYSICAL THERAPIST

## 2019-10-14 NOTE — PROGRESS NOTES
Physical Therapy Daily Progress Note     Patient Name: Manuela Molina         :  1965  Referring Physician: Florentino Rosario MD        Subjective   Manuela Molina reports: continued improvement with decreased pain and improved mobility and function -      Objective   Less Tender posterior (R) shoulder; Decreased Posterior impingement,  -   Less Tight OLIVIER ;  minimal tightness IRB (R) shoulder     See Exercise, Manual, and Modality Logs for complete treatment.     Functional / Therapeutic Activities: 10  min  · TAPING / BRACING:   · SEE EXERCISE FLOW SHEET -   · Jt protection, ADL modification; Posture and       Assessment/Plan  (R) shoulder strain / pain; Posterior Impingement with probable labral involvement -   Improving with decreased pain and improved mobility and function -      Progress strengthening /stabilization /functional activity     _________________________________________________  Manual Therapy:                 mins  45127;  Therapeutic Exercise:    30     mins  01892;     Neuromuscular Brandon:    10    mins  48660;    Therapeutic Activity:      15     mins  41931;     Gait Training:                      mins  91343;     Ultrasound:                          mins  12521;    Electrical Stimulation:         mins  88016 ( );  Dry Needling                       mins self-pay     Timed Treatment:   55   mins                  Total Treatment:     70   mins     Rodrigo Perales PT  Physical Therapist

## 2019-10-16 ENCOUNTER — TREATMENT (OUTPATIENT)
Dept: PHYSICAL THERAPY | Facility: CLINIC | Age: 54
End: 2019-10-16

## 2019-10-16 DIAGNOSIS — M75.41 IMPINGEMENT SYNDROME OF SHOULDER, RIGHT: ICD-10-CM

## 2019-10-16 DIAGNOSIS — S46.911D STRAIN OF RIGHT SHOULDER, SUBSEQUENT ENCOUNTER: Primary | ICD-10-CM

## 2019-10-16 PROCEDURE — 97112 NEUROMUSCULAR REEDUCATION: CPT | Performed by: PHYSICAL THERAPIST

## 2019-10-16 PROCEDURE — 97110 THERAPEUTIC EXERCISES: CPT | Performed by: PHYSICAL THERAPIST

## 2019-10-16 PROCEDURE — 97530 THERAPEUTIC ACTIVITIES: CPT | Performed by: PHYSICAL THERAPIST

## 2019-10-20 NOTE — PROGRESS NOTES
Physical Therapy Daily Progress Note     Patient Name: Manuela Molina         :  1965  Referring Physician: Florentino Rosario MD        Subjective   Manuela Molina reports: continued improvement with decreased pain and improved mobility and function -      Objective   Less Tender posterior (R) shoulder; Decreased Posterior impingement,  -   Less Tightness w/ OLIVIER ;  minimal tightness IRB (R) shoulder     See Exercise, Manual, and Modality Logs for complete treatment.     Functional / Therapeutic Activities: 10  min  · TAPING / BRACING:   · SEE EXERCISE FLOW SHEET -   · Jt protection, ADL modification; Posture and       Assessment/Plan  (R) shoulder strain / pain; Posterior Impingement with probable labral involvement -   Improving with decreased pain and improved mobility and function -      Progress strengthening /stabilization /functional activity     _________________________________________________  Manual Therapy:                 mins  88122;  Therapeutic Exercise:    35     mins  63530;     Neuromuscular Brandon:    10    mins  18189;    Therapeutic Activity:      10     mins  46975;     Gait Training:                      mins  01011;     Ultrasound:                          mins  36890;    Electrical Stimulation:         mins  20811 ( );  Dry Needling                       mins self-pay     Timed Treatment:   55   mins                  Total Treatment:     70   mins     Rodrigo Perales PT  Physical Therapist

## 2019-10-21 ENCOUNTER — TREATMENT (OUTPATIENT)
Dept: PHYSICAL THERAPY | Facility: CLINIC | Age: 54
End: 2019-10-21

## 2019-10-21 DIAGNOSIS — M75.41 IMPINGEMENT SYNDROME OF SHOULDER, RIGHT: ICD-10-CM

## 2019-10-21 DIAGNOSIS — S46.911D STRAIN OF RIGHT SHOULDER, SUBSEQUENT ENCOUNTER: Primary | ICD-10-CM

## 2019-10-21 PROCEDURE — 97530 THERAPEUTIC ACTIVITIES: CPT | Performed by: PHYSICAL THERAPIST

## 2019-10-21 PROCEDURE — 97112 NEUROMUSCULAR REEDUCATION: CPT | Performed by: PHYSICAL THERAPIST

## 2019-10-21 PROCEDURE — 97110 THERAPEUTIC EXERCISES: CPT | Performed by: PHYSICAL THERAPIST

## 2019-10-21 NOTE — PROGRESS NOTES
Physical Therapy Daily Progress Note     Patient Name: Manuela Molina         :  1965  Referring Physician: Florentino Rosario MD        Subjective   Manuela Molina reports: continued improvement with decreased pain and improved mobility and function -   Notes 100% improvement - No functional limitations -      Objective   Full AROM (R) Shoulder w/o pain  Strength (R) Shoulder WF/NL w/o pain  (-) Tests (R) shoulder;      See Exercise, Manual, and Modality Logs for complete treatment.     Functional / Therapeutic Activities: 10  min  · TAPING / BRACING:   · SEE EXERCISE FLOW SHEET -   · Jt protection, ADL modification; Posture and       Assessment/Plan  (R) shoulder strain / pain; Posterior Impingement   Much improved with resolution of pain and restoration of mobility, strength, hobby activities, and ADL's -   All goals met      Pt would benefit from discontinuing formal PT and continuing her HEP    Plan: DC PT to HEP     _________________________________________________  Manual Therapy:                 mins  35894;  Therapeutic Exercise:    35     mins  38503;     Neuromuscular Brandon:    10    mins  84351;    Therapeutic Activity:      10     mins  42135;     Gait Training:                      mins  23879;     Ultrasound:                          mins  86459;    Electrical Stimulation:         mins  22510 ( );  Dry Needling                       mins self-pay     Timed Treatment:   55   mins                  Total Treatment:     70   mins     Rodrigo Perales, PT  Physical Therapist

## 2020-11-10 ENCOUNTER — DOCUMENTATION (OUTPATIENT)
Dept: SURGERY | Facility: CLINIC | Age: 55
End: 2020-11-10

## 2020-11-10 PROBLEM — D25.1 FIBROIDS, INTRAMURAL: Status: ACTIVE | Noted: 2018-07-11

## 2022-06-23 ENCOUNTER — PREP FOR SURGERY (OUTPATIENT)
Dept: OTHER | Facility: HOSPITAL | Age: 57
End: 2022-06-23

## 2022-06-23 DIAGNOSIS — Z12.11 SCREENING FOR COLON CANCER: Primary | ICD-10-CM

## 2022-08-08 PROBLEM — Z12.11 SCREENING FOR COLON CANCER: Status: ACTIVE | Noted: 2022-08-08

## 2022-08-09 RX ORDER — ESTRADIOL 2 MG/1
1 TABLET ORAL EVERY OTHER DAY
COMMUNITY

## 2022-08-24 RX ORDER — ASPIRIN 81 MG/1
81 TABLET ORAL DAILY
COMMUNITY

## 2022-08-25 ENCOUNTER — ANESTHESIA EVENT (OUTPATIENT)
Dept: GASTROENTEROLOGY | Facility: HOSPITAL | Age: 57
End: 2022-08-25

## 2022-08-25 ENCOUNTER — ANESTHESIA (OUTPATIENT)
Dept: GASTROENTEROLOGY | Facility: HOSPITAL | Age: 57
End: 2022-08-25

## 2022-08-25 ENCOUNTER — HOSPITAL ENCOUNTER (OUTPATIENT)
Facility: HOSPITAL | Age: 57
Setting detail: HOSPITAL OUTPATIENT SURGERY
Discharge: HOME | End: 2022-08-25
Attending: COLON & RECTAL SURGERY | Admitting: COLON & RECTAL SURGERY

## 2022-08-25 VITALS
WEIGHT: 153.2 LBS | RESPIRATION RATE: 16 BRPM | BODY MASS INDEX: 24.62 KG/M2 | DIASTOLIC BLOOD PRESSURE: 77 MMHG | HEIGHT: 66 IN | HEART RATE: 62 BPM | SYSTOLIC BLOOD PRESSURE: 120 MMHG | TEMPERATURE: 98.3 F | OXYGEN SATURATION: 100 %

## 2022-08-25 PROCEDURE — 45378 DIAGNOSTIC COLONOSCOPY: CPT | Performed by: COLON & RECTAL SURGERY

## 2022-08-25 PROCEDURE — 25010000002 PROPOFOL 10 MG/ML EMULSION: Performed by: ANESTHESIOLOGY

## 2022-08-25 RX ORDER — PROPOFOL 10 MG/ML
VIAL (ML) INTRAVENOUS AS NEEDED
Status: DISCONTINUED | OUTPATIENT
Start: 2022-08-25 | End: 2022-08-25 | Stop reason: SURG

## 2022-08-25 RX ORDER — SODIUM CHLORIDE, SODIUM LACTATE, POTASSIUM CHLORIDE, CALCIUM CHLORIDE 600; 310; 30; 20 MG/100ML; MG/100ML; MG/100ML; MG/100ML
30 INJECTION, SOLUTION INTRAVENOUS CONTINUOUS PRN
Status: DISCONTINUED | OUTPATIENT
Start: 2022-08-25 | End: 2022-08-25 | Stop reason: HOSPADM

## 2022-08-25 RX ORDER — SODIUM CHLORIDE 0.9 % (FLUSH) 0.9 %
10 SYRINGE (ML) INJECTION AS NEEDED
Status: DISCONTINUED | OUTPATIENT
Start: 2022-08-25 | End: 2022-08-25 | Stop reason: HOSPADM

## 2022-08-25 RX ORDER — PROPOFOL 10 MG/ML
VIAL (ML) INTRAVENOUS CONTINUOUS PRN
Status: DISCONTINUED | OUTPATIENT
Start: 2022-08-25 | End: 2022-08-25 | Stop reason: SURG

## 2022-08-25 RX ORDER — LIDOCAINE HYDROCHLORIDE 20 MG/ML
INJECTION, SOLUTION INFILTRATION; PERINEURAL AS NEEDED
Status: DISCONTINUED | OUTPATIENT
Start: 2022-08-25 | End: 2022-08-25 | Stop reason: SURG

## 2022-08-25 RX ORDER — SODIUM CHLORIDE 0.9 % (FLUSH) 0.9 %
10 SYRINGE (ML) INJECTION EVERY 12 HOURS SCHEDULED
Status: DISCONTINUED | OUTPATIENT
Start: 2022-08-25 | End: 2022-08-25 | Stop reason: HOSPADM

## 2022-08-25 RX ADMIN — LIDOCAINE HYDROCHLORIDE 40 MG: 20 INJECTION, SOLUTION INFILTRATION; PERINEURAL at 10:06

## 2022-08-25 RX ADMIN — SODIUM CHLORIDE, POTASSIUM CHLORIDE, SODIUM LACTATE AND CALCIUM CHLORIDE 30 ML/HR: 600; 310; 30; 20 INJECTION, SOLUTION INTRAVENOUS at 09:38

## 2022-08-25 RX ADMIN — PROPOFOL 180 MCG/KG/MIN: 10 INJECTION, EMULSION INTRAVENOUS at 10:06

## 2022-08-25 RX ADMIN — Medication 70 MG: at 10:06

## 2022-08-25 NOTE — ANESTHESIA POSTPROCEDURE EVALUATION
"Patient: Manuela Molina    Procedure Summary     Date: 08/25/22 Room / Location:  [facility]    Anesthesia Start: 1002 Anesthesia Stop: 1027    Procedure: COLONOSCOPY into cecum (N/A ) Diagnosis:       Screening for colon cancer      (Screening for colon cancer [Z12.11])    Surgeons: Heena Mixon MD Provider: Manuel Gonsalez MD    Anesthesia Type: MAC ASA Status: 2          Anesthesia Type: MAC    Vitals  Vitals Value Taken Time   /77 08/25/22 1043   Temp 36.8 °C (98.3 °F) 08/25/22 1043   Pulse 62 08/25/22 1043   Resp 16 08/25/22 1043   SpO2 100 % 08/25/22 1043           Post Anesthesia Care and Evaluation    Patient location during evaluation: bedside  Patient participation: complete - patient participated  Level of consciousness: awake and alert  Pain management: adequate    Airway patency: patent  Anesthetic complications: No anesthetic complications    Cardiovascular status: acceptable  Respiratory status: acceptable  Hydration status: acceptable    Comments: /77 (BP Location: Left arm, Patient Position: Sitting)   Pulse 62   Temp 36.8 °C (98.3 °F) (Temporal)   Resp 16   Ht 167.6 cm (66\")   Wt 69.5 kg (153 lb 3.2 oz)   SpO2 100%   BMI 24.73 kg/m²           "

## 2022-08-25 NOTE — H&P
Manuela Molina is a 57 y.o. female  who is referred by Heena Mixon MD for a colonoscopy. She   has an indications: family history of colon cancer.     She denies any change in bowel function, melena, or hematochezia.    Past Medical History:   Diagnosis Date   • Allergic    • Arthritis    • Diverticula of colon 01/28/2016   • Family history of colon cancer     BROTHER DX 2020   • Fluid retention    • H/O bladder infections    • History of seasonal allergies    • Internal hemorrhoids 01/28/2016    Rubberband ligation.   • PONV (postoperative nausea and vomiting)        Past Surgical History:   Procedure Laterality Date   • COLONOSCOPY N/A 01/28/2016   • EYE SURGERY  2019    AGE 55   • FOOT SURGERY Left 05/27/2022    BONE SPUR   • HYSTERECTOMY  07/2018    DR. BRANDON MORELAND/[facility]   • TONSILLECTOMY         Medications Prior to Admission   Medication Sig Dispense Refill Last Dose   • aspirin 81 MG EC tablet Take 81 mg by mouth Daily. HOLD   8/24/2022 at 0730time   • Boswellia-Glucosamine-Vit D (OSTEO BI-FLEX ONE PER DAY PO) Take 1 tablet by mouth Daily.   8/24/2022 at Unknown time   • cetirizine (ZyrTEC) 10 MG tablet Take 10 mg by mouth daily.   8/24/2022 at Unknown time   • Cholecalciferol (VITAMIN D3) 5000 UNITS capsule capsule Take 5,000 Units by mouth Daily.   8/24/2022 at Unknown time   • estradiol (ESTRACE) 2 MG tablet Take 1 mg by mouth Every Other Day.   8/23/2022 at Unknown time   • fluticasone (FLONASE) 50 MCG/ACT nasal spray 2 sprays into the nostril(s) as directed by provider Daily As Needed. Administer 2 sprays in each nostril for each dose.   Past Month at Unknown time   • furosemide (LASIX) 20 MG tablet 1/2 tab po QD-BID prn edema (Patient taking differently: Take 20 mg by mouth As Needed. 1/2 tab po QD-BID prn edema) 30 tablet 2    • montelukast (SINGULAIR) 10 MG tablet Take 1 tablet by mouth Every Night. (Patient taking differently: Take 10 mg by mouth Daily As Needed.) 90 tablet 3 Past  Month at Unknown time   • triamterene-hydrochlorothiazide (MAXZIDE-25) 37.5-25 MG per tablet Take 1 tablet by mouth Daily. 30 tablet 0 8/24/2022 at Unknown time       No Known Allergies    Family History   Problem Relation Age of Onset   • Hypertension Father    • Heart disease Father    • Cancer Brother         COLON   • Malig Hyperthermia Neg Hx        Social History     Socioeconomic History   • Marital status:    Tobacco Use   • Smoking status: Never Smoker   • Smokeless tobacco: Never Used   Vaping Use   • Vaping Use: Never used   Substance and Sexual Activity   • Alcohol use: Yes     Comment: three of four drinks per month   • Drug use: Never   • Sexual activity: Yes     Partners: Male       Review of Systems   Gastrointestinal: Negative for abdominal pain, nausea and vomiting.   All other systems reviewed and are negative.      Vitals:    08/25/22 0929   BP: 121/79   Pulse: 67   Resp: 15   SpO2: 96%         Physical Exam  Constitutional:       Appearance: She is well-developed.   HENT:      Head: Normocephalic and atraumatic.   Eyes:      Pupils: Pupils are equal, round, and reactive to light.   Cardiovascular:      Rate and Rhythm: Regular rhythm.   Pulmonary:      Effort: Pulmonary effort is normal.   Abdominal:      General: There is no distension.      Palpations: Abdomen is soft.   Musculoskeletal:         General: Normal range of motion.   Skin:     General: Skin is warm and dry.   Neurological:      Mental Status: She is alert and oriented to person, place, and time.   Psychiatric:         Thought Content: Thought content normal.         Judgment: Judgment normal.           Assessment & Plan      indications: family history of colon cancer         I recommend colonoscopy.  I described risks, benefits of the procedure with the patient including but not limited to bleeding, infection, possibility of perforation and possible polypectomy. All of the patient's questions were answered and they would  like to proceed with the above recommendations.

## 2022-08-25 NOTE — ANESTHESIA PREPROCEDURE EVALUATION
Anesthesia Evaluation     Patient summary reviewed and Nursing notes reviewed   history of anesthetic complications: PONV  NPO Solid Status: > 6 hours  NPO Liquid Status: > 6 hours           Airway   Mallampati: II  TM distance: >3 FB  Neck ROM: full  no difficulty expected and No difficulty expected  Dental - normal exam     Pulmonary - negative pulmonary ROS and normal exam    breath sounds clear to auscultation  (-) rhonchi, decreased breath sounds, wheezes, rales, stridor  Cardiovascular - negative cardio ROS and normal exam    NYHA Classification: I  Rhythm: regular  Rate: normal    (-) murmur, weak pulses, friction rub, systolic click, carotid bruits, JVD, peripheral edema      Neuro/Psych- negative ROS  GI/Hepatic/Renal/Endo - negative ROS     Musculoskeletal (-) negative ROS    Abdominal  - normal exam    Abdomen: soft.   Substance History - negative use     OB/GYN negative ob/gyn ROS         Other - negative ROS                       Anesthesia Plan    ASA 2     MAC     intravenous induction     Anesthetic plan, risks, benefits, and alternatives have been provided, discussed and informed consent has been obtained with: patient.        CODE STATUS:

## 2022-08-25 NOTE — DISCHARGE INSTRUCTIONS
For the next 24 hours patient needs to be with a responsible adult.    For 24 hours DO NOT drive, operate machinery, appliances, drink alcohol, make important decisions or sign legal documents.    Start with a light or bland diet and advance to regular diet as tolerated.    Follow recommendations on procedure report provided by your doctor.    Call Dr Mixon for problems [phone]    Problems may include but not limited to: large amounts of bleeding, trouble breathing, repeated vomiting, severe unrelieved pain, fever or chills.

## (undated) DEVICE — CANN O2 ETCO2 FITS ALL CONN CO2 SMPL A/ 7IN DISP LF

## (undated) DEVICE — KT ORCA ORCAPOD DISP STRL

## (undated) DEVICE — TUBING, SUCTION, 1/4" X 10', STRAIGHT: Brand: MEDLINE

## (undated) DEVICE — SENSR O2 OXIMAX FNGR A/ 18IN NONSTR

## (undated) DEVICE — LN SMPL CO2 SHTRM SD STREAM W/M LUER

## (undated) DEVICE — ADAPT CLN BIOGUARD AIR/H2O DISP